# Patient Record
Sex: MALE | Race: WHITE | URBAN - METROPOLITAN AREA
[De-identification: names, ages, dates, MRNs, and addresses within clinical notes are randomized per-mention and may not be internally consistent; named-entity substitution may affect disease eponyms.]

---

## 2018-07-02 ENCOUNTER — OFFICE VISIT (OUTPATIENT)
Dept: URGENT CARE | Facility: URGENT CARE | Age: 63
End: 2018-07-02

## 2018-07-02 VITALS
WEIGHT: 214 LBS | RESPIRATION RATE: 20 BRPM | SYSTOLIC BLOOD PRESSURE: 129 MMHG | TEMPERATURE: 99.1 F | DIASTOLIC BLOOD PRESSURE: 81 MMHG | OXYGEN SATURATION: 95 % | HEART RATE: 89 BPM

## 2018-07-02 DIAGNOSIS — B02.30 HERPES ZOSTER WITH OPHTHALMIC COMPLICATION, UNSPECIFIED HERPES ZOSTER EYE DISEASE: Primary | ICD-10-CM

## 2018-07-02 PROCEDURE — 99213 OFFICE O/P EST LOW 20 MIN: CPT | Performed by: PHYSICIAN ASSISTANT

## 2018-07-02 RX ORDER — VALACYCLOVIR HYDROCHLORIDE 1 G/1
1000 TABLET, FILM COATED ORAL 3 TIMES DAILY
Qty: 21 TABLET | Refills: 0 | Status: SHIPPED | OUTPATIENT
Start: 2018-07-02

## 2018-07-02 NOTE — MR AVS SNAPSHOT
After Visit Summary   7/2/2018    Cesar Purvis    MRN: [id number]           Patient Information     Date Of Birth          1955        Visit Information        Provider Department      7/2/2018 5:05 PM Kala Mitchell PA-C [facility]        Today's Diagnoses     Herpes zoster with ophthalmic complication, unspecified herpes zoster eye disease    -  1       Follow-ups after your visit        Additional Services     OPHTHALMOLOGY ADULT REFERRAL       Your provider has referred you to: [facility]: [facility]  [phone]   [email or web address]    Please be aware that coverage of these services is subject to the terms and limitations of your health insurance plan.  Call member services at your health plan with any benefit or coverage questions.      Please bring the following with you to your appointment:    (1) Any X-Rays, CTs or MRIs which have been performed.  Contact the facility where they were done to arrange for  prior to your scheduled appointment.    (2) List of current medications  (3) This referral request   (4) Any documents/labs given to you for this referral                  Who to contact     If you have questions or need follow up information about today's clinic visit or your schedule please contact [facility] directly at [phone].  Normal or non-critical lab and imaging results will be communicated to you by MyChart, letter or phone within 4 business days after the clinic has received the results. If you do not hear from us within 7 days, please contact the clinic through MyChart or phone. If you have a critical or abnormal lab result, we will notify you by phone as soon as possible.  Submit refill requests through [employer] or call your pharmacy and they will forward the refill request to us. Please allow 3 business days for your refill to be completed.           Additional Information About Your Visit        Care EveryWhere ID     This is your Care EveryWhere ID. This could be used by other organizations to access your [facility] medical records  [id number]        Your Vitals Were     Pulse Temperature Respirations Pulse Oximetry          89 99.1  F (37.3  C) (Tympanic) 20 95%         Blood Pressure from Last 3 Encounters:   07/02/18 129/81    Weight from Last 3 Encounters:   07/02/18 214 lb (97.1 kg)              We Performed the Following     OPHTHALMOLOGY ADULT REFERRAL          Today's Medication Changes          These changes are accurate as of 7/2/18  8:31 PM.  If you have any questions, ask your nurse or doctor.               Start taking these medicines.        Dose/Directions    valACYclovir 1000 mg tablet   Commonly known as:  VALTREX   Used for:  Herpes zoster with ophthalmic complication, unspecified herpes zoster eye disease   Started by:  Kala Mitchell PA-C        Dose:  1000 mg   Take 1 tablet (1,000 mg) by mouth 3 times daily   Quantity:  21 tablet   Refills:  0            Where to get your medicines      These medications were sent to [facility] [address] [place] [id number]     Phone:  [phone]     valACYclovir 1000 mg tablet                Primary Care Provider Fax #    Physician No Ref-Primary [phone]       No address on file        Equal Access to Services     KYLE CLEMENS AH: Denys prestono Sostellaali, waaxda luqadaha, qaybta kaalmada adeegyada, pamela dale. So [facility] [phone].    ATENCIÓN: Si habla español, tiene a burns disposición servicios gratuitos de asistencia lingüística. Llame al [phone].    We comply with applicable federal civil rights laws and [place] laws. We do not discriminate on the basis of race, color, national origin, age, disability, sex, sexual orientation, or gender identity.            Thank you!     Thank you for  choosing [facility]  for your care. Our goal is always to provide you with excellent care. Hearing back from our patients is one way we can continue to improve our services. Please take a few minutes to complete the written survey that you may receive in the mail after your visit with us. Thank you!             Your Updated Medication List - Protect others around you: Learn how to safely use, store and throw away your medicines at www.disposemymeds.org.          This list is accurate as of 7/2/18  8:31 PM.  Always use your most recent med list.                   Brand Name Dispense Instructions for use Diagnosis    valACYclovir 1000 mg tablet    VALTREX    21 tablet    Take 1 tablet (1,000 mg) by mouth 3 times daily    Herpes zoster with ophthalmic complication, unspecified herpes zoster eye disease

## 2018-07-03 NOTE — PROGRESS NOTES
SUBJECTIVE:  Cesar Purvis is a 63 year old male who presents to the clinic today for a rash.  Onset of rash was 3 day(s) ago.   Rash is sudden onset.   He had right ear pain and right scalp pain that preceded the rash.   Location of the rash: right upper forehead and scalp. This morning he woke up with swelling near his eye and noticed his vision was blurry in his right eye.  Quality/symptoms of rash: burning, painful, red and blistering   Symptoms are severe and rash seems to be worsening.  Previous history of a similar rash? No  Recent exposure history: hx of chicken pox as child    Associated symptoms include: headache, sore throat and right submandibular swelling      History reviewed. No pertinent past medical history.  Current Outpatient Prescriptions   Medication Sig Dispense Refill     valACYclovir (VALTREX) 1000 mg tablet Take 1 tablet (1,000 mg) by mouth 3 times daily 21 tablet 0     Social History   Substance Use Topics     Smoking status: Never Smoker     Smokeless tobacco: Never Used     Alcohol use Not on file         EXAM:   /81  Pulse 89  Temp 99.1  F (37.3  C) (Tympanic)  Resp 20  Wt 214 lb (97.1 kg)  SpO2 95%  GENERAL: alert, no acute distress.  SKIN: Rash description:    Distribution: dermatomal rash along right scalp and forehead with vesicles crusted over on an erythematous base. Some redness and swelling along superior right eye. Conjunctiva are clear. PERRLA and EOMI bilaterally.     GENERAL APPEARANCE: healthy, alert and no distress  HENT: ear canals and TM's normal.  Nose and mouth without ulcers, erythema or lesions. No vesicular lesions in ear canal.  right submandibular adenopathy  NECK: no cervical adenopathy  RESP: lungs clear to auscultation - no rales, rhonchi or wheezes  CV: regular rates and rhythm, normal S1 S2, no murmur noted    ASSESSMENT:  (B02.30) Herpes zoster with ophthalmic complication, unspecified herpes zoster eye disease  (primary encounter  diagnosis)    PLAN:  1) start valtrex 1g TID tonight  2) reviewed Up To Date and oral steroids were not recommended  3) referral for eye doctor given and discussed that he needs to be seen within 24 hours (tomorrow). He will call [facility] tomorrow to be evaluated and treated.  4) recommended shingrix vaccine  5) education given on cause, progression of symptoms, prevention of spread to unvaccinated or immunocompromised individuals  6) discussed 600mg ibuprofen and 1000mg tylenol every 8 hours as needed for pain  7) follow up in clinic if pain is severe or gets worse

## 2024-03-31 ENCOUNTER — HEALTH MAINTENANCE LETTER (OUTPATIENT)
Age: 69
End: 2024-03-31